# Patient Record
Sex: FEMALE | Race: ASIAN | Employment: FULL TIME | URBAN - METROPOLITAN AREA
[De-identification: names, ages, dates, MRNs, and addresses within clinical notes are randomized per-mention and may not be internally consistent; named-entity substitution may affect disease eponyms.]

---

## 2017-07-07 PROCEDURE — 84144 ASSAY OF PROGESTERONE: CPT | Performed by: OBSTETRICS & GYNECOLOGY

## 2017-07-07 PROCEDURE — 86901 BLOOD TYPING SEROLOGIC RH(D): CPT | Performed by: OBSTETRICS & GYNECOLOGY

## 2017-07-07 PROCEDURE — 86900 BLOOD TYPING SEROLOGIC ABO: CPT | Performed by: OBSTETRICS & GYNECOLOGY

## 2017-07-10 PROCEDURE — 84144 ASSAY OF PROGESTERONE: CPT | Performed by: OBSTETRICS & GYNECOLOGY

## 2017-07-28 PROBLEM — O36.80X0 ENCOUNTER TO DETERMINE FETAL VIABILITY OF PREGNANCY: Status: ACTIVE | Noted: 2017-07-28

## 2017-07-28 PROCEDURE — 86900 BLOOD TYPING SEROLOGIC ABO: CPT | Performed by: OBSTETRICS & GYNECOLOGY

## 2017-07-28 PROCEDURE — 86850 RBC ANTIBODY SCREEN: CPT | Performed by: OBSTETRICS & GYNECOLOGY

## 2017-07-28 PROCEDURE — 86901 BLOOD TYPING SEROLOGIC RH(D): CPT | Performed by: OBSTETRICS & GYNECOLOGY

## 2017-07-28 PROCEDURE — 87491 CHLMYD TRACH DNA AMP PROBE: CPT | Performed by: OBSTETRICS & GYNECOLOGY

## 2017-07-28 PROCEDURE — 87340 HEPATITIS B SURFACE AG IA: CPT | Performed by: OBSTETRICS & GYNECOLOGY

## 2017-07-28 PROCEDURE — 86780 TREPONEMA PALLIDUM: CPT | Performed by: OBSTETRICS & GYNECOLOGY

## 2017-07-28 PROCEDURE — 86762 RUBELLA ANTIBODY: CPT | Performed by: OBSTETRICS & GYNECOLOGY

## 2017-07-28 PROCEDURE — 87591 N.GONORRHOEAE DNA AMP PROB: CPT | Performed by: OBSTETRICS & GYNECOLOGY

## 2017-07-28 PROCEDURE — 87389 HIV-1 AG W/HIV-1&-2 AB AG IA: CPT | Performed by: OBSTETRICS & GYNECOLOGY

## 2017-07-28 PROCEDURE — 36415 COLL VENOUS BLD VENIPUNCTURE: CPT | Performed by: OBSTETRICS & GYNECOLOGY

## 2017-07-28 PROCEDURE — 85025 COMPLETE CBC W/AUTO DIFF WBC: CPT | Performed by: OBSTETRICS & GYNECOLOGY

## 2017-07-28 PROCEDURE — 87086 URINE CULTURE/COLONY COUNT: CPT | Performed by: OBSTETRICS & GYNECOLOGY

## 2017-11-10 ENCOUNTER — TELEPHONE (OUTPATIENT)
Dept: OBGYN UNIT | Facility: HOSPITAL | Age: 32
End: 2017-11-10

## 2017-12-01 PROCEDURE — 82950 GLUCOSE TEST: CPT | Performed by: OBSTETRICS & GYNECOLOGY

## 2017-12-08 PROCEDURE — 82951 GLUCOSE TOLERANCE TEST (GTT): CPT | Performed by: OBSTETRICS & GYNECOLOGY

## 2017-12-08 PROCEDURE — 82952 GTT-ADDED SAMPLES: CPT | Performed by: OBSTETRICS & GYNECOLOGY

## 2017-12-18 PROCEDURE — 87389 HIV-1 AG W/HIV-1&-2 AB AG IA: CPT | Performed by: OBSTETRICS & GYNECOLOGY

## 2017-12-18 PROCEDURE — 36415 COLL VENOUS BLD VENIPUNCTURE: CPT | Performed by: OBSTETRICS & GYNECOLOGY

## 2018-02-02 PROCEDURE — 87081 CULTURE SCREEN ONLY: CPT | Performed by: OBSTETRICS & GYNECOLOGY

## 2018-02-02 PROCEDURE — 87653 STREP B DNA AMP PROBE: CPT | Performed by: OBSTETRICS & GYNECOLOGY

## 2018-03-06 ENCOUNTER — HOSPITAL ENCOUNTER (INPATIENT)
Facility: HOSPITAL | Age: 33
LOS: 2 days | Discharge: HOME | End: 2018-03-08
Attending: OBSTETRICS & GYNECOLOGY | Admitting: OBSTETRICS & GYNECOLOGY
Payer: COMMERCIAL

## 2018-03-06 PROBLEM — Z34.90 PREGNANCY: Status: ACTIVE | Noted: 2018-03-06

## 2018-03-06 LAB
ANTIBODY SCREEN: NEGATIVE
BILIRUBIN URINE: NEGATIVE
CONTROL RUN WITHIN 24 HOURS?: YES
ERYTHROCYTE [DISTWIDTH] IN BLOOD BY AUTOMATED COUNT: 13.7 % (ref 11.5–16)
GLUCOSE BLD-MCNC: 101 MG/DL (ref 65–99)
GLUCOSE BLD-MCNC: 115 MG/DL (ref 65–99)
GLUCOSE BLD-MCNC: 120 MG/DL (ref 65–99)
GLUCOSE URINE: NEGATIVE
HCT VFR BLD AUTO: 35.5 % (ref 34–50)
HGB BLD-MCNC: 12.1 G/DL (ref 12–16)
KETONE URINE: NEGATIVE
MCH RBC QN AUTO: 31.5 PG (ref 27–33.2)
MCHC RBC AUTO-ENTMCNC: 34.1 G/DL (ref 31–37)
MCV RBC AUTO: 92.4 FL (ref 81–100)
NITRITE URINE: NEGATIVE
PH URINE: 6.5 (ref 5–8)
PLATELET # BLD AUTO: 257 10(3)UL (ref 150–450)
PROTEIN URINE: NEGATIVE
RBC # BLD AUTO: 3.84 X10(6)UL (ref 3.8–5.1)
RED CELL DISTRIBUTION WIDTH-SD: 46.1 FL (ref 35.1–46.3)
RH BLOOD TYPE: POSITIVE
SPEC GRAVITY: 1.02 (ref 1–1.03)
T PALLIDUM AB SER QL IA: NONREACTIVE
URINE CLARITY: CLEAR
URINE COLOR: YELLOW
UROBILINOGEN URINE: 0.2
WBC # BLD AUTO: 15.6 X10(3) UL (ref 4–13)

## 2018-03-06 PROCEDURE — 86780 TREPONEMA PALLIDUM: CPT | Performed by: OBSTETRICS & GYNECOLOGY

## 2018-03-06 PROCEDURE — 82962 GLUCOSE BLOOD TEST: CPT

## 2018-03-06 PROCEDURE — 86900 BLOOD TYPING SEROLOGIC ABO: CPT | Performed by: OBSTETRICS & GYNECOLOGY

## 2018-03-06 PROCEDURE — 0HQ9XZZ REPAIR PERINEUM SKIN, EXTERNAL APPROACH: ICD-10-PCS | Performed by: OBSTETRICS & GYNECOLOGY

## 2018-03-06 PROCEDURE — 85027 COMPLETE CBC AUTOMATED: CPT | Performed by: OBSTETRICS & GYNECOLOGY

## 2018-03-06 PROCEDURE — 86850 RBC ANTIBODY SCREEN: CPT | Performed by: OBSTETRICS & GYNECOLOGY

## 2018-03-06 PROCEDURE — 86901 BLOOD TYPING SEROLOGIC RH(D): CPT | Performed by: OBSTETRICS & GYNECOLOGY

## 2018-03-06 RX ORDER — BISACODYL 10 MG
10 SUPPOSITORY, RECTAL RECTAL ONCE AS NEEDED
Status: ACTIVE | OUTPATIENT
Start: 2018-03-06 | End: 2018-03-06

## 2018-03-06 RX ORDER — ACETAMINOPHEN 325 MG/1
650 TABLET ORAL EVERY 4 HOURS PRN
Status: DISCONTINUED | OUTPATIENT
Start: 2018-03-06 | End: 2018-03-08

## 2018-03-06 RX ORDER — IBUPROFEN 600 MG/1
600 TABLET ORAL EVERY 6 HOURS
Status: DISCONTINUED | OUTPATIENT
Start: 2018-03-06 | End: 2018-03-08

## 2018-03-06 RX ORDER — TRISODIUM CITRATE DIHYDRATE AND CITRIC ACID MONOHYDRATE 500; 334 MG/5ML; MG/5ML
30 SOLUTION ORAL AS NEEDED
Status: DISCONTINUED | OUTPATIENT
Start: 2018-03-06 | End: 2018-03-06

## 2018-03-06 RX ORDER — EPHEDRINE SULFATE 50 MG/ML
5 INJECTION, SOLUTION INTRAVENOUS AS NEEDED
Status: DISCONTINUED | OUTPATIENT
Start: 2018-03-06 | End: 2018-03-06

## 2018-03-06 RX ORDER — SODIUM CHLORIDE, SODIUM LACTATE, POTASSIUM CHLORIDE, CALCIUM CHLORIDE 600; 310; 30; 20 MG/100ML; MG/100ML; MG/100ML; MG/100ML
INJECTION, SOLUTION INTRAVENOUS CONTINUOUS
Status: DISCONTINUED | OUTPATIENT
Start: 2018-03-06 | End: 2018-03-06

## 2018-03-06 RX ORDER — SIMETHICONE 80 MG
80 TABLET,CHEWABLE ORAL 3 TIMES DAILY PRN
Status: DISCONTINUED | OUTPATIENT
Start: 2018-03-06 | End: 2018-03-08

## 2018-03-06 RX ORDER — ZOLPIDEM TARTRATE 5 MG/1
5 TABLET ORAL NIGHTLY PRN
Status: DISCONTINUED | OUTPATIENT
Start: 2018-03-06 | End: 2018-03-08

## 2018-03-06 RX ORDER — TERBUTALINE SULFATE 1 MG/ML
0.25 INJECTION, SOLUTION SUBCUTANEOUS AS NEEDED
Status: DISCONTINUED | OUTPATIENT
Start: 2018-03-06 | End: 2018-03-06

## 2018-03-06 RX ORDER — HYDROCODONE BITARTRATE AND ACETAMINOPHEN 5; 325 MG/1; MG/1
1 TABLET ORAL EVERY 4 HOURS PRN
Status: DISCONTINUED | OUTPATIENT
Start: 2018-03-06 | End: 2018-03-08

## 2018-03-06 RX ORDER — NALBUPHINE HCL 10 MG/ML
2.5 AMPUL (ML) INJECTION
Status: DISCONTINUED | OUTPATIENT
Start: 2018-03-06 | End: 2018-03-06

## 2018-03-06 RX ORDER — IBUPROFEN 600 MG/1
600 TABLET ORAL ONCE AS NEEDED
Status: DISCONTINUED | OUTPATIENT
Start: 2018-03-06 | End: 2018-03-06

## 2018-03-06 RX ORDER — DOCUSATE SODIUM 100 MG/1
100 CAPSULE, LIQUID FILLED ORAL
Status: DISCONTINUED | OUTPATIENT
Start: 2018-03-06 | End: 2018-03-08

## 2018-03-06 RX ORDER — HYDROCODONE BITARTRATE AND ACETAMINOPHEN 5; 325 MG/1; MG/1
2 TABLET ORAL EVERY 4 HOURS PRN
Status: DISCONTINUED | OUTPATIENT
Start: 2018-03-06 | End: 2018-03-08

## 2018-03-06 NOTE — PROGRESS NOTES
Pt  EDC 3/6/18 presents to L&D with c/o uc's every 5 mins since apx 0300. Pt states she has had spotting throughout the day. Pt denies leaking of fluid, states + fetal movement. Urine spec obtained. EFM tested and applied.  Pt labels verified per pt and

## 2018-03-06 NOTE — H&P
[facility] Patient Status:  Inpatient    1/3/1985 MRN [id number]   Location [address] [facility] Attending Kindra Gracia, [address] Day # 0 PCP No primary care provider on file.      Date of Admiss Rash    OBJECTIVE:    Temp:  [97.5 °F (36.4 °C)-98.7 °F (37.1 °C)] 98.7 °F (37.1 °C)  Pulse:  [] 116  Resp:  [16-18] 18  BP: ()/(50-79) 88/56    Lungs:   normal unlabored breathing, no wheezing   Heart:   regular rate and rhythm   Abdomen:

## 2018-03-06 NOTE — L&D DELIVERY NOTE
She was found to be complete/+1. She pushed with very good effort for 15 minutes under epidural anesthesia. She then delivered a viable baby girl via . The baby was vigorous on delivery and was bulb suctioned.  She was placed on the mother's lap for war clamping:  Yes   Time in sec:  30   Cord blood disposition:  to lab   Gases sent?:  No             Resuscitation    Method:  None          [place] Measurements    Weight:  7 lb 3.3 oz Length:  48.3 cm   Head circum.:  32 cm Chest circum.:  32 cm      Abdom

## 2018-03-07 LAB
BASOPHILS # BLD AUTO: 0.03 X10(3) UL (ref 0–0.1)
BASOPHILS NFR BLD AUTO: 0.2 %
EOSINOPHIL # BLD AUTO: 0.19 X10(3) UL (ref 0–0.3)
EOSINOPHIL NFR BLD AUTO: 1.2 %
ERYTHROCYTE [DISTWIDTH] IN BLOOD BY AUTOMATED COUNT: 13.9 % (ref 11.5–16)
GLUCOSE BLD-MCNC: 101 MG/DL (ref 65–99)
HCT VFR BLD AUTO: 31.7 % (ref 34–50)
HGB BLD-MCNC: 10.7 G/DL (ref 12–16)
IMMATURE GRANULOCYTE COUNT: 0.07 X10(3) UL (ref 0–1)
IMMATURE GRANULOCYTE RATIO %: 0.4 %
LYMPHOCYTES # BLD AUTO: 1.86 X10(3) UL (ref 0.9–4)
LYMPHOCYTES NFR BLD AUTO: 11.8 %
MCH RBC QN AUTO: 31.5 PG (ref 27–33.2)
MCHC RBC AUTO-ENTMCNC: 33.8 G/DL (ref 31–37)
MCV RBC AUTO: 93.2 FL (ref 81–100)
MONOCYTES # BLD AUTO: 0.92 X10(3) UL (ref 0.1–1)
MONOCYTES NFR BLD AUTO: 5.8 %
NEUTROPHIL ABS PRELIM: 12.72 X10 (3) UL (ref 1.3–6.7)
NEUTROPHILS # BLD AUTO: 12.72 X10(3) UL (ref 1.3–6.7)
NEUTROPHILS NFR BLD AUTO: 80.6 %
PLATELET # BLD AUTO: 201 10(3)UL (ref 150–450)
RBC # BLD AUTO: 3.4 X10(6)UL (ref 3.8–5.1)
RED CELL DISTRIBUTION WIDTH-SD: 47.3 FL (ref 35.1–46.3)
WBC # BLD AUTO: 15.8 X10(3) UL (ref 4–13)

## 2018-03-07 PROCEDURE — 85025 COMPLETE CBC W/AUTO DIFF WBC: CPT | Performed by: OBSTETRICS & GYNECOLOGY

## 2018-03-07 PROCEDURE — 82962 GLUCOSE BLOOD TEST: CPT

## 2018-03-07 NOTE — PROGRESS NOTES
2000-ASSISTED UP TO BATHROOM VOID 300CC BLOOD TINGED URINE. TOLERATED WELL. REPORT GIVEN TO Yon JAQUEZ.  TRANSFER TO [facility] IN STABLE CONDITION

## 2018-03-07 NOTE — PROGRESS NOTES
PPD #1  Pt without complaints  BP 99/58 (BP Location: Right arm)   Pulse 75   Temp 97.8 °F (36.6 °C) (Oral)   Resp 18   Ht 5' 6\" (1.676 m)   Wt 165 lb (74.8 kg)   LMP 05/30/2017   SpO2 97%   Breastfeeding?  Yes   BMI 26.63 kg/m²    Hb - 10.7  WBC - 15.6  F

## 2018-03-08 VITALS
TEMPERATURE: 98 F | SYSTOLIC BLOOD PRESSURE: 102 MMHG | HEART RATE: 83 BPM | OXYGEN SATURATION: 97 % | WEIGHT: 165 LBS | RESPIRATION RATE: 17 BRPM | BODY MASS INDEX: 26.52 KG/M2 | HEIGHT: 66 IN | DIASTOLIC BLOOD PRESSURE: 71 MMHG

## 2018-03-08 NOTE — PROGRESS NOTES
OB Progress Note PPD#2    S: Feels well. Ambulating, eating. Pain controlled. Lochia mild. Breastfeeding. Voiding without difficulty, + flatus,   O:   Blood pressure 102/71, pulse 83, temperature 97.7 °F (36.5 °C), temperature source Oral, resp.  rate 17,

## 2018-03-12 ENCOUNTER — NURSE ONLY (OUTPATIENT)
Dept: LACTATION | Facility: HOSPITAL | Age: 33
End: 2018-03-12
Attending: OBSTETRICS & GYNECOLOGY
Payer: COMMERCIAL

## 2018-03-12 ENCOUNTER — TELEPHONE (OUTPATIENT)
Dept: OBGYN UNIT | Facility: HOSPITAL | Age: 33
End: 2018-03-12

## 2018-03-12 DIAGNOSIS — O92.29 POSTPARTUM NIPPLE PAIN: Primary | ICD-10-CM

## 2018-03-12 PROCEDURE — 99213 OFFICE O/P EST LOW 20 MIN: CPT

## 2018-03-13 ENCOUNTER — TELEPHONE (OUTPATIENT)
Dept: OBGYN UNIT | Facility: HOSPITAL | Age: 33
End: 2018-03-13

## 2018-04-18 PROCEDURE — 87624 HPV HI-RISK TYP POOLED RSLT: CPT | Performed by: OBSTETRICS & GYNECOLOGY

## 2018-04-18 PROCEDURE — 88175 CYTOPATH C/V AUTO FLUID REDO: CPT | Performed by: OBSTETRICS & GYNECOLOGY

## 2021-07-23 ENCOUNTER — OFFICE VISIT (OUTPATIENT)
Dept: PERINATAL CARE | Facility: HOSPITAL | Age: 36
End: 2021-07-23
Attending: OBSTETRICS & GYNECOLOGY
Payer: COMMERCIAL

## 2021-07-23 ENCOUNTER — LAB ENCOUNTER (OUTPATIENT)
Dept: LAB | Age: 36
End: 2021-07-23
Attending: OBSTETRICS & GYNECOLOGY
Payer: COMMERCIAL

## 2021-07-23 VITALS
WEIGHT: 153 LBS | BODY MASS INDEX: 24.59 KG/M2 | HEART RATE: 82 BPM | HEIGHT: 66 IN | SYSTOLIC BLOOD PRESSURE: 112 MMHG | DIASTOLIC BLOOD PRESSURE: 71 MMHG

## 2021-07-23 DIAGNOSIS — O09.522 MULTIGRAVIDA OF ADVANCED MATERNAL AGE IN SECOND TRIMESTER: ICD-10-CM

## 2021-07-23 DIAGNOSIS — O36.8390 FETAL TACHYCARDIA AFFECTING MANAGEMENT OF MOTHER: ICD-10-CM

## 2021-07-23 DIAGNOSIS — R00.9 HEART BEAT ABNORMALITY: ICD-10-CM

## 2021-07-23 DIAGNOSIS — O36.8390 FETAL ARRHYTHMIA AFFECTING PREGNANCY, ANTEPARTUM: Primary | ICD-10-CM

## 2021-07-23 DIAGNOSIS — O36.8390 FETAL ARRHYTHMIA AFFECTING PREGNANCY, ANTEPARTUM: ICD-10-CM

## 2021-07-23 PROCEDURE — 76827 ECHO EXAM OF FETAL HEART: CPT | Performed by: OBSTETRICS & GYNECOLOGY

## 2021-07-23 PROCEDURE — 36415 COLL VENOUS BLD VENIPUNCTURE: CPT

## 2021-07-23 PROCEDURE — 76827 ECHO EXAM OF FETAL HEART: CPT

## 2021-07-23 PROCEDURE — 99205 OFFICE O/P NEW HI 60 MIN: CPT | Performed by: OBSTETRICS & GYNECOLOGY

## 2021-07-23 PROCEDURE — 86235 NUCLEAR ANTIGEN ANTIBODY: CPT

## 2021-07-23 PROCEDURE — 76805 OB US >/= 14 WKS SNGL FETUS: CPT | Performed by: OBSTETRICS & GYNECOLOGY

## 2021-07-23 NOTE — PROGRESS NOTES
Outpatient Maternal-Fetal Medicine Consultation    Dear Dr. Octavio Haro    Thank you for requesting ultrasound evaluation and maternal fetal medicine consultation on your patient Frida Bautista.   As you are aware she is a 39year old female  with a singl PRENATAL 27-0.8 MG Oral Tab, Take 1 tablet by mouth daily. , Disp: , Rfl:   •  Docosahexaenoic Acid (DHA COMPLETE OR), Take by mouth., Disp: , Rfl:   Allergies:   Sulfa Antibiotics       RASH    PHYSICAL EXAMINATION:  /71   Pulse 82   Ht 5' 6\" (1.676 diabetes  · Intrauterine fetal death    As a result, enhanced pregnancy surveillance is advised for these patients including a comprehensive ultrasound to assess for fetal malformations  (at 20 weeks) and a third trimester ultrasound assessment for fetal g rates.    The patient has already obtained a low-risk  NPIT result and was appropriately reassured. FETAL ARRHYTHMIA  The atrial rate appears to be in the 145–150 range and the ventricular rate in the 70s.   I suspect this represents intermittently nonc

## 2021-07-26 ENCOUNTER — ULTRASOUND ENCOUNTER (OUTPATIENT)
Dept: PERINATAL CARE | Facility: HOSPITAL | Age: 36
End: 2021-07-26
Attending: PEDIATRICS
Payer: COMMERCIAL

## 2021-07-26 DIAGNOSIS — O09.522 MULTIGRAVIDA OF ADVANCED MATERNAL AGE IN SECOND TRIMESTER: Primary | ICD-10-CM

## 2021-07-26 DIAGNOSIS — O09.522 MULTIGRAVIDA OF ADVANCED MATERNAL AGE IN SECOND TRIMESTER: ICD-10-CM

## 2021-07-26 DIAGNOSIS — O36.8390 FETAL TACHYCARDIA AFFECTING MANAGEMENT OF MOTHER: ICD-10-CM

## 2021-07-26 LAB
SSA AUTOAB: <100 AU/ML (ref ?–100)
SSB AUTOAB: <100 AU/ML (ref ?–100)

## 2021-07-26 PROCEDURE — 76827 ECHO EXAM OF FETAL HEART: CPT

## 2021-07-26 PROCEDURE — 76825 ECHO EXAM OF FETAL HEART: CPT | Performed by: PEDIATRICS

## 2021-07-26 PROCEDURE — 93325 DOPPLER ECHO COLOR FLOW MAPG: CPT

## 2021-07-26 NOTE — PROGRESS NOTES
CARDIOLOGY CONSULTATION AND FETAL ECHOCARDIOGRAM :    Dear Dr. Jh Driscoll     Thank you for requesting fetal echocardiogram and  cardiology consultation on your patient Natalia Taylor.   As you are aware she is a 39year old female  wi grandmother is alive. She indicated that her maternal grandfather is alive. She indicated that her paternal grandmother is alive.  She indicated that her paternal grandfather is alive.        Medications:   Current Outpatient Medications:   •  PRENATAL 27-0 no  Limited unremarkable fetal echocardiogram with one to one conduction. Normal mechanical PA intervals. Fetal echocardiogram findings are described above. No color Doppler flow was used due to early pregnancy.  It was a limited unremarkable fetal echoca

## 2021-08-04 ENCOUNTER — OFFICE VISIT (OUTPATIENT)
Dept: PERINATAL CARE | Facility: HOSPITAL | Age: 36
End: 2021-08-04
Attending: PEDIATRICS
Payer: COMMERCIAL

## 2021-08-04 DIAGNOSIS — O36.8390 FETAL ARRHYTHMIA AFFECTING PREGNANCY, ANTEPARTUM: Primary | ICD-10-CM

## 2021-08-04 DIAGNOSIS — O36.8390 FETAL ARRHYTHMIA AFFECTING PREGNANCY, ANTEPARTUM: ICD-10-CM

## 2021-08-04 PROCEDURE — 93325 DOPPLER ECHO COLOR FLOW MAPG: CPT

## 2021-08-04 PROCEDURE — 76827 ECHO EXAM OF FETAL HEART: CPT

## 2021-08-04 PROCEDURE — 76825 ECHO EXAM OF FETAL HEART: CPT | Performed by: PEDIATRICS

## 2021-08-04 NOTE — PROGRESS NOTES
CARDIOLOGY CONSULTATION AND FETAL ECHOCARDIOGRAM :     Dear Dr. Polk     Thank you for requesting a  follow up fetal echocardiogram and a follow up  cardiology consultation on your patient Domitila St.    As you are aware she is a 2 that her maternal grandmother is alive. She indicated that her maternal grandfather is alive. She indicated that her paternal grandmother is alive.  She indicated that her paternal grandfather is alive.        Medications:   Current Outpatient Medications: arteries. Main PA: the main pulmonary artery can be seen bifurcating into the ductus arteriosus and the right pulmonary artery. Ascending aorta: normal size and morphology. Ductal arch: Left ductus arteriosus.  Aortic arch: Single left sided aortic arch wit antibodies  · Limited unremarkable fetal echocardiogram with one to one conduction. Normal mechanical KS intervals. No evidence for fetal heart block    RECOMMENDATIONS :    · Continue care with Dr. Polk and KAILEE  · Level II at 20 weeks.   · Patient to [facility]

## 2021-10-23 ENCOUNTER — TELEPHONE (OUTPATIENT)
Dept: OBGYN UNIT | Facility: HOSPITAL | Age: 36
End: 2021-10-23

## 2021-10-23 DIAGNOSIS — O24.410 DIET CONTROLLED GESTATIONAL DIABETES MELLITUS (GDM) IN THIRD TRIMESTER: Primary | ICD-10-CM

## 2021-10-23 DIAGNOSIS — R73.01 ABNORMAL FASTING GLUCOSE: ICD-10-CM

## 2021-10-23 NOTE — TELEPHONE ENCOUNTER
I have reviewed the patient's Bs - she has consisitently elevated fasting BS's.  Please advise that she see [facility] for consultation for possible medications

## 2021-10-25 NOTE — TELEPHONE ENCOUNTER
Per chart review   Patient seen by Diabetes Counselor 10/1/21    Original referral is on chart - updated with Dr. Hunter Khoury note and marked as Urgent.   In Addition,   MFM referral Created     patient made aware  Verbalized understanding and agrees to plan

## 2021-10-28 NOTE — PROGRESS NOTES
Jenae Wood  Dear Dr. Hunter Khoury     Thank you for requesting an ultrasound and maternal-fetal medicine consultation on your patient Cristhian Daniel.   As you are aware she is a 39year old female  with a jarrett preg reassured.      FETAL ARRHYTHMIA  RESOLVED    GESTATIONAL DIABETES  1 hour GDM screen was 198 mg/dL. In light of her high screen results and history of GDM, her 3-hour GTT test was not performed in this pregnancy.   The patient was informed of the potential pregnancy but the long term data on / childhood effects are lacking. Metformin crosses the placenta and  levels are >70% of the maternal level at delivery.   Metformin is not associated with increased rates for NICU admission,  hypog care.     Case discussed with patient who demonstrated understanding and agreement with plan. Thank you for allowing me to participate in the care of this patient. Please feel free to contact me with any questions.     Shaw Ko MD  Maternal-Fetal Me

## 2021-10-29 ENCOUNTER — ULTRASOUND ENCOUNTER (OUTPATIENT)
Dept: PERINATAL CARE | Facility: HOSPITAL | Age: 36
End: 2021-10-29
Attending: OBSTETRICS & GYNECOLOGY
Payer: COMMERCIAL

## 2021-10-29 VITALS
SYSTOLIC BLOOD PRESSURE: 107 MMHG | DIASTOLIC BLOOD PRESSURE: 72 MMHG | HEIGHT: 66 IN | WEIGHT: 175 LBS | BODY MASS INDEX: 28.13 KG/M2 | HEART RATE: 99 BPM

## 2021-10-29 DIAGNOSIS — O24.414 GESTATIONAL DIABETES MELLITUS (GDM) REQUIRING INSULIN: ICD-10-CM

## 2021-10-29 DIAGNOSIS — O24.419 GDM (GESTATIONAL DIABETES MELLITUS): ICD-10-CM

## 2021-10-29 DIAGNOSIS — O09.523 MULTIGRAVIDA OF ADVANCED MATERNAL AGE IN THIRD TRIMESTER: ICD-10-CM

## 2021-10-29 DIAGNOSIS — O36.8390 FETAL ARRHYTHMIA AFFECTING PREGNANCY, ANTEPARTUM: ICD-10-CM

## 2021-10-29 DIAGNOSIS — O09.523 MULTIGRAVIDA OF ADVANCED MATERNAL AGE IN THIRD TRIMESTER: Primary | ICD-10-CM

## 2021-10-29 PROCEDURE — 99215 OFFICE O/P EST HI 40 MIN: CPT | Performed by: OBSTETRICS & GYNECOLOGY

## 2021-10-29 PROCEDURE — 76816 OB US FOLLOW-UP PER FETUS: CPT | Performed by: OBSTETRICS & GYNECOLOGY

## 2021-10-29 RX ORDER — LANCING DEVICE
1 EACH MISCELLANEOUS NIGHTLY
Qty: 100 EACH | Refills: 1 | Status: SHIPPED | OUTPATIENT
Start: 2021-10-29

## 2021-11-08 ENCOUNTER — TELEPHONE (OUTPATIENT)
Dept: PERINATAL CARE | Facility: HOSPITAL | Age: 36
End: 2021-11-08

## 2021-11-08 NOTE — TELEPHONE ENCOUNTER
32w2d  Received BS log for date range 10/30-11/8     Low  High Out of Range   Fasting Blood Sugar 90 116 7 out of 9   Post Breakfast 90 114 0 out of 9   Post Lunch 91 120 1 out of 9   Post Dinner 96 121 2 out of 8     Patient is currently taking Levemir 10 units HS. To Dr. Chris Cesar for review.

## 2021-11-11 ENCOUNTER — TELEPHONE (OUTPATIENT)
Dept: PERINATAL CARE | Facility: HOSPITAL | Age: 36
End: 2021-11-11

## 2021-11-11 NOTE — TELEPHONE ENCOUNTER
32w5d  Received BS log for date range 11/9-11/11     Low  High Out of Range   Fasting Blood Sugar 100 107 3 out of 3   Post Breakfast 108 120 1 out of 2   Post Lunch 108 125 1 out of 2   Post Dinner 98 110 0 out of 2       Pt messaged concerned that even after increasing her Levemir to 14 units on 11/8, fasting sugars remain elevated. Please advise. Patient is currently taking Levemir 14 units @ HS. To Dr. Love Daily for review.

## 2021-11-11 NOTE — TELEPHONE ENCOUNTER
New regimen:    Levemir 18 units @ [facility]. Ravinder Diallo M.D.   Maternal-Fetal Medicine

## 2021-11-18 ENCOUNTER — TELEPHONE (OUTPATIENT)
Dept: PERINATAL CARE | Facility: HOSPITAL | Age: 36
End: 2021-11-18

## 2021-11-18 DIAGNOSIS — O24.414 GESTATIONAL DIABETES MELLITUS (GDM) REQUIRING INSULIN: ICD-10-CM

## 2021-11-18 NOTE — TELEPHONE ENCOUNTER
New regimen:    Levemir 22 units at .[facility]. Denver Flanagan M.D.   Maternal-Fetal Medicine

## 2021-11-18 NOTE — TELEPHONE ENCOUNTER
33w5d  Received BS log for date range 11/12-11/17     Low  High Out of Range   Fasting Blood Sugar 92 110 4/5   Post Breakfast 93 120 1/5   Post Lunch 89 144 1/6   Post Dinner 105 120 1/5     Patient is currently taking Levemir 18 units at [facility]    To Dr. Miramontes Estimable

## 2021-11-24 ENCOUNTER — OFFICE VISIT (OUTPATIENT)
Dept: PERINATAL CARE | Facility: HOSPITAL | Age: 36
End: 2021-11-24
Attending: OBSTETRICS & GYNECOLOGY
Payer: COMMERCIAL

## 2021-11-24 VITALS
DIASTOLIC BLOOD PRESSURE: 62 MMHG | WEIGHT: 178 LBS | SYSTOLIC BLOOD PRESSURE: 106 MMHG | HEART RATE: 73 BPM | BODY MASS INDEX: 29 KG/M2

## 2021-11-24 DIAGNOSIS — O36.8390 FETAL ARRHYTHMIA AFFECTING PREGNANCY, ANTEPARTUM: ICD-10-CM

## 2021-11-24 DIAGNOSIS — O09.523 MULTIGRAVIDA OF ADVANCED MATERNAL AGE IN THIRD TRIMESTER: ICD-10-CM

## 2021-11-24 DIAGNOSIS — O24.414 INSULIN CONTROLLED GESTATIONAL DIABETES MELLITUS (GDM) IN THIRD TRIMESTER: ICD-10-CM

## 2021-11-24 DIAGNOSIS — O09.523 MULTIGRAVIDA OF ADVANCED MATERNAL AGE IN THIRD TRIMESTER: Primary | ICD-10-CM

## 2021-11-24 DIAGNOSIS — O24.414 GESTATIONAL DIABETES MELLITUS (GDM) REQUIRING INSULIN: ICD-10-CM

## 2021-11-24 PROCEDURE — 76816 OB US FOLLOW-UP PER FETUS: CPT | Performed by: OBSTETRICS & GYNECOLOGY

## 2021-11-24 PROCEDURE — 76819 FETAL BIOPHYS PROFIL W/O NST: CPT | Performed by: OBSTETRICS & GYNECOLOGY

## 2021-11-24 PROCEDURE — 99212 OFFICE O/P EST SF 10 MIN: CPT | Performed by: OBSTETRICS & GYNECOLOGY

## 2021-11-24 PROCEDURE — 76819 FETAL BIOPHYS PROFIL W/O NST: CPT

## 2021-11-24 NOTE — PROGRESS NOTES
Isi Nixon    Dear Dr. Natasha Spring    Thank you for requesting ultrasound evaluation and maternal fetal medicine consultation on your patient Ezio Salter.   As you are aware she is a 39year old female  with a singleto A 3 vessel cord is noted. Cardiac activity is present at 126 bpm  EFW 2825 g ( 6 lb 4 oz); 70%. JOYCE is  12.9 cm. MVP is 4.7 cm  BPP is 8/8. The fetal measurements are consistent with established EDC.  No gross ultrasound evidence of structural ab cord insertion   · Possible Levemir allergy     RECOMMENDATIONS:  · Continue care with Dr. Polk  · Follow-up Growth and BPP in 4 weeks  · Insulin as noted above-convert to Lantus dosing weekly NST's at 32 weeks and increase to twice weekly at 34 weeks  ·

## 2021-11-24 NOTE — PROGRESS NOTES
Pt here for Growth Ultrasound/BPP/Velamentous cord insertion/AMA/GDM  +fm noted per patient  Pt denies complaints.

## 2021-11-29 ENCOUNTER — TELEPHONE (OUTPATIENT)
Dept: PERINATAL CARE | Facility: HOSPITAL | Age: 36
End: 2021-11-29

## 2021-11-29 NOTE — TELEPHONE ENCOUNTER
35w2d  Received BS log for date range 11/25-11/29     Low  High Out of Range   Fasting Blood Sugar 87 105 3 out of 5   Post Breakfast 93 120 1 out of 4   Post Lunch 105 120 1 out of 4   Post Dinner 102 122 1 out of 3     Patient is currently taking Lantus 30 units HS. To Dr. Paola Euceda for review.

## 2021-12-01 RX ORDER — INSULIN GLARGINE 100 [IU]/ML
32 INJECTION, SOLUTION SUBCUTANEOUS NIGHTLY
Qty: 9.92 ML | Refills: 0 | Status: SHIPPED | OUTPATIENT
Start: 2021-12-01 | End: 2021-12-24

## 2021-12-19 ENCOUNTER — HOSPITAL ENCOUNTER (OUTPATIENT)
Facility: HOSPITAL | Age: 36
Setting detail: OBSERVATION
Discharge: HOME | End: 2021-12-19
Attending: OBSTETRICS & GYNECOLOGY | Admitting: OBSTETRICS & GYNECOLOGY
Payer: COMMERCIAL

## 2021-12-19 VITALS
HEART RATE: 95 BPM | HEIGHT: 65.98 IN | WEIGHT: 185 LBS | TEMPERATURE: 98 F | SYSTOLIC BLOOD PRESSURE: 124 MMHG | BODY MASS INDEX: 29.73 KG/M2 | DIASTOLIC BLOOD PRESSURE: 60 MMHG

## 2021-12-19 PROCEDURE — 59025 FETAL NON-STRESS TEST: CPT

## 2021-12-19 PROCEDURE — 99213 OFFICE O/P EST LOW 20 MIN: CPT

## 2021-12-19 NOTE — PROGRESS NOTES
Pt is a 39year old female admitted to TRG4/TRG4-A. Patient presents with:  R/o Labor: Abdominal pain started at 6-7 this morning, intermittent 3-4/10 on pain scale. Pt is  38w1d intra-uterine pregnancy. History obtained, consents signed.  Or

## 2021-12-19 NOTE — NST
Physician Evaluation      NST Interpretation: Reactive    Disposition:   Discharged    Comments:    Not in labor    Julia Valverde MD

## 2021-12-19 NOTE — PROGRESS NOTES
Discharged to home per ambulatory in stable condition not in active labor with written and verbal instructions. Patient verbalizes understanding of information given.

## 2021-12-19 NOTE — NST
Nonstress Test   Patient: Mckay Abreu    Gestation: 38w1d    NST:       Variability: Moderate           Accelerations: Yes           Decelerations: None            Baseline: 130 BPM           Uterine Irritability: No           Contractions: Irregular

## 2021-12-22 ENCOUNTER — HOSPITAL ENCOUNTER (INPATIENT)
Facility: HOSPITAL | Age: 36
LOS: 2 days | Discharge: HOME | End: 2021-12-24
Attending: OBSTETRICS & GYNECOLOGY | Admitting: OBSTETRICS & GYNECOLOGY
Payer: COMMERCIAL

## 2021-12-22 ENCOUNTER — APPOINTMENT (OUTPATIENT)
Dept: OBGYN CLINIC | Facility: HOSPITAL | Age: 36
End: 2021-12-22
Payer: COMMERCIAL

## 2021-12-22 PROCEDURE — 3E0P7GC INTRODUCTION OF OTHER THERAPEUTIC SUBSTANCE INTO FEMALE REPRODUCTIVE, VIA NATURAL OR ARTIFICIAL OPENING: ICD-10-PCS | Performed by: OBSTETRICS & GYNECOLOGY

## 2021-12-22 PROCEDURE — 82962 GLUCOSE BLOOD TEST: CPT

## 2021-12-22 PROCEDURE — 86850 RBC ANTIBODY SCREEN: CPT | Performed by: OBSTETRICS & GYNECOLOGY

## 2021-12-22 PROCEDURE — 85025 COMPLETE CBC W/AUTO DIFF WBC: CPT | Performed by: OBSTETRICS & GYNECOLOGY

## 2021-12-22 PROCEDURE — 86900 BLOOD TYPING SEROLOGIC ABO: CPT | Performed by: OBSTETRICS & GYNECOLOGY

## 2021-12-22 PROCEDURE — 86780 TREPONEMA PALLIDUM: CPT | Performed by: OBSTETRICS & GYNECOLOGY

## 2021-12-22 PROCEDURE — 86901 BLOOD TYPING SEROLOGIC RH(D): CPT | Performed by: OBSTETRICS & GYNECOLOGY

## 2021-12-22 RX ORDER — IBUPROFEN 600 MG/1
600 TABLET ORAL EVERY 6 HOURS PRN
Status: DISCONTINUED | OUTPATIENT
Start: 2021-12-22 | End: 2021-12-23

## 2021-12-22 RX ORDER — AMMONIA INHALANTS 0.04 G/.3ML
0.3 INHALANT RESPIRATORY (INHALATION) AS NEEDED
Status: DISCONTINUED | OUTPATIENT
Start: 2021-12-22 | End: 2021-12-23

## 2021-12-22 RX ORDER — HYDROMORPHONE HYDROCHLORIDE 1 MG/ML
1 INJECTION, SOLUTION INTRAMUSCULAR; INTRAVENOUS; SUBCUTANEOUS EVERY 2 HOUR PRN
Status: DISCONTINUED | OUTPATIENT
Start: 2021-12-22 | End: 2021-12-23

## 2021-12-22 RX ORDER — ONDANSETRON 2 MG/ML
4 INJECTION INTRAMUSCULAR; INTRAVENOUS EVERY 6 HOURS PRN
Status: DISCONTINUED | OUTPATIENT
Start: 2021-12-22 | End: 2021-12-23

## 2021-12-22 RX ORDER — TRISODIUM CITRATE DIHYDRATE AND CITRIC ACID MONOHYDRATE 500; 334 MG/5ML; MG/5ML
30 SOLUTION ORAL AS NEEDED
Status: DISCONTINUED | OUTPATIENT
Start: 2021-12-22 | End: 2021-12-23

## 2021-12-22 RX ORDER — ACETAMINOPHEN 500 MG
500 TABLET ORAL EVERY 6 HOURS PRN
Status: DISCONTINUED | OUTPATIENT
Start: 2021-12-22 | End: 2021-12-23

## 2021-12-22 RX ORDER — TERBUTALINE SULFATE 1 MG/ML
0.25 INJECTION, SOLUTION SUBCUTANEOUS AS NEEDED
Status: DISCONTINUED | OUTPATIENT
Start: 2021-12-22 | End: 2021-12-23

## 2021-12-22 RX ORDER — SODIUM CHLORIDE, SODIUM LACTATE, POTASSIUM CHLORIDE, CALCIUM CHLORIDE 600; 310; 30; 20 MG/100ML; MG/100ML; MG/100ML; MG/100ML
INJECTION, SOLUTION INTRAVENOUS CONTINUOUS
Status: DISCONTINUED | OUTPATIENT
Start: 2021-12-22 | End: 2021-12-23

## 2021-12-23 ENCOUNTER — ANESTHESIA (OUTPATIENT)
Dept: OBGYN UNIT | Facility: HOSPITAL | Age: 36
End: 2021-12-23
Payer: COMMERCIAL

## 2021-12-23 ENCOUNTER — ANESTHESIA EVENT (OUTPATIENT)
Dept: OBGYN UNIT | Facility: HOSPITAL | Age: 36
End: 2021-12-23
Payer: COMMERCIAL

## 2021-12-23 PROCEDURE — 0HQ9XZZ REPAIR PERINEUM SKIN, EXTERNAL APPROACH: ICD-10-PCS | Performed by: OBSTETRICS & GYNECOLOGY

## 2021-12-23 PROCEDURE — 10907ZC DRAINAGE OF AMNIOTIC FLUID, THERAPEUTIC FROM PRODUCTS OF CONCEPTION, VIA NATURAL OR ARTIFICIAL OPENING: ICD-10-PCS | Performed by: OBSTETRICS & GYNECOLOGY

## 2021-12-23 PROCEDURE — 82962 GLUCOSE BLOOD TEST: CPT

## 2021-12-23 RX ORDER — DEXTROSE MONOHYDRATE 25 G/50ML
50 INJECTION, SOLUTION INTRAVENOUS
Status: DISCONTINUED | OUTPATIENT
Start: 2021-12-23 | End: 2021-12-23

## 2021-12-23 RX ORDER — ACETAMINOPHEN 325 MG/1
650 TABLET ORAL EVERY 6 HOURS PRN
Status: DISCONTINUED | OUTPATIENT
Start: 2021-12-23 | End: 2021-12-24

## 2021-12-23 RX ORDER — BUPIVACAINE HCL/0.9 % NACL/PF 0.25 %
5 PLASTIC BAG, INJECTION (ML) EPIDURAL AS NEEDED
Status: DISCONTINUED | OUTPATIENT
Start: 2021-12-23 | End: 2021-12-23

## 2021-12-23 RX ORDER — IBUPROFEN 600 MG/1
600 TABLET ORAL EVERY 6 HOURS
Status: DISCONTINUED | OUTPATIENT
Start: 2021-12-23 | End: 2021-12-24

## 2021-12-23 RX ORDER — BISACODYL 10 MG
10 SUPPOSITORY, RECTAL RECTAL ONCE AS NEEDED
Status: DISCONTINUED | OUTPATIENT
Start: 2021-12-23 | End: 2021-12-24

## 2021-12-23 RX ORDER — METHYLERGONOVINE MALEATE 0.2 MG/ML
INJECTION INTRAVENOUS
Status: COMPLETED
Start: 2021-12-23 | End: 2021-12-23

## 2021-12-23 RX ORDER — SIMETHICONE 80 MG
80 TABLET,CHEWABLE ORAL 3 TIMES DAILY PRN
Status: DISCONTINUED | OUTPATIENT
Start: 2021-12-23 | End: 2021-12-24

## 2021-12-23 RX ORDER — LIDOCAINE HYDROCHLORIDE 10 MG/ML
INJECTION, SOLUTION EPIDURAL; INFILTRATION; INTRACAUDAL; PERINEURAL AS NEEDED
Status: DISCONTINUED | OUTPATIENT
Start: 2021-12-23 | End: 2021-12-23 | Stop reason: SURG

## 2021-12-23 RX ORDER — NALBUPHINE HCL 10 MG/ML
2.5 AMPUL (ML) INJECTION
Status: DISCONTINUED | OUTPATIENT
Start: 2021-12-23 | End: 2021-12-23

## 2021-12-23 RX ORDER — LIDOCAINE HYDROCHLORIDE AND EPINEPHRINE 15; 5 MG/ML; UG/ML
INJECTION, SOLUTION EPIDURAL AS NEEDED
Status: DISCONTINUED | OUTPATIENT
Start: 2021-12-23 | End: 2021-12-23 | Stop reason: SURG

## 2021-12-23 RX ORDER — DOCUSATE SODIUM 100 MG/1
100 CAPSULE, LIQUID FILLED ORAL
Status: DISCONTINUED | OUTPATIENT
Start: 2021-12-23 | End: 2021-12-24

## 2021-12-23 RX ADMIN — LIDOCAINE HYDROCHLORIDE AND EPINEPHRINE 3 ML: 15; 5 INJECTION, SOLUTION EPIDURAL at 10:22:00

## 2021-12-23 RX ADMIN — LIDOCAINE HYDROCHLORIDE 3 MG: 10 INJECTION, SOLUTION EPIDURAL; INFILTRATION; INTRACAUDAL; PERINEURAL at 10:17:00

## 2021-12-23 NOTE — PAYOR COMM NOTE
--------------  ADMISSION REVIEW     Payor: [address] #:  [id number]  Authorization Number:  [id number]           H&P - H&P Note          SUBJECTIVE:    Martha Arteaga is a 39year old  female with [facility] 22 a secondary to 44 Emi Hernandez  3. AMA  4. GDMA2 - on insulin. Will monitor during pregnancy.     5. FWB - reassuring        12/23 L&D NOTE    Pre Op Dx:  IUP at 38 weeks; gest DM A2     Post Op Dx: Same     Procedure: Normal Spontaneous Vaginal Delivery(precipitous) (SUBLIMAZE) 0.05 MG/ML injection 100 mcg     Date Action Dose Route User    12/23/2021 1007 Given 100 mcg Epidural Marian Primrose, MD      fentaNYL citrate (SUBLIMAZE) 0.05 MG/ML injection     Date Action Dose Route User    12/23/2021 1024 Given 100 mcg Epidu Gayla Monk, JORJE    12/23/2021 1033 Rate/Dose Change 6 ruthann-units/min Intravenous Gayla Monk, JORJE    12/23/2021 7106 Rate/Dose Change 4 ruthann-units/min Intravenous Gayla Monk, JORJE    12/23/2021 7732 New Bag 2 ruthann-units/min Intravenous Robert

## 2021-12-23 NOTE — L&D DELIVERY NOTE
Vaginal Delivery Note          Mckay Abreu Patient Status:  Inpatient    1/3/1985 MRN [id number]   Location [facility] Attending Yenni Draper, [address] [facility] Day # 1 PCP None P

## 2021-12-23 NOTE — ANESTHESIA PROCEDURE NOTES
Labor Analgesia  Performed by: Ngoc Brown MD  Authorized by: Ngoc Brown MD       General Information and Staff    Start Time:  12/23/2021 10:15 AM  End Time:  12/23/2021 10:21 AM  Anesthesiologist:  gNoc Brown MD  Performed by:   Anesthesiologist  Mendez De La Rosa

## 2021-12-23 NOTE — PROGRESS NOTES
NURSING ADMISSION NOTE    Patient admitted via wheelchair. Oriented to room. Safety precautions initiated. Bed in low position. Call light in reach. Both mom/ baby ID bands verified, hugs and kisses on,  safety reviewed.

## 2021-12-23 NOTE — H&P
[address] Patient Status:  Inpatient    1/3/1985 MRN [id number]   Location [address] [place] [address] Attending Jerica Benavidez, [address] [facility] Day # 1 PCP None Pcp     SUBJECTIVE:    Adelaida Titus is a 39 yea 3  ONETOUCH DELICA LANCETS 28R Does not apply Misc, Test QID as directed, Disp: 200 each, Rfl: 2      Allergies:   Sulfa Antibiotics       RASH    OBJECTIVE:    Temp:  [97.1 °F (36.2 °C)-98.6 °F (37 °C)] 98.6 °F (37 °C)  Pulse:  [71-86] 76  Resp:  [18] 18

## 2021-12-23 NOTE — ANESTHESIA PREPROCEDURE EVALUATION
PRE-OP EVALUATION    Patient Name: Florence Isaac    Admit Diagnosis: preg state  Pregnancy   (normal spontaneous vaginal delivery)    Pre-op Diagnosis: * No surgery found *        Anesthesia Procedure: LABOR ANALGESIA    * Surgery not found *    Pre-o Q6H  acetaminophen (TYLENOL) tab 650 mg, 650 mg, Oral, Q6H PRN  docusate sodium (COLACE) cap 100 mg, 100 mg, Oral, [email or web address]  bisacodyl (DULCOLAX) rectal suppository 10 mg, 10 mg, Rectal, Once PRN  magnesium hydroxide (MILK OF MAGNESIA) 400 MG/5ML susp Complications  (-) history of anesthetic complications         GI/Hepatic/Renal    Negative GI/hepatic/renal ROS. Cardiovascular    Negative cardiovascular ROS.     Exercise tolerance: good     MET: >4 Admission:  **None**

## 2021-12-23 NOTE — PROGRESS NOTES
Patient called, reports increase pain and pressure. RN at bedside, took patient off of peanut ball and noted baby to be crowing. Pt involuntary pushing, baby delivered easily with RN at bedside in one push. MD notified, second RN called to bedside. Baby pin

## 2021-12-23 NOTE — PROGRESS NOTES
Patient up to bathroom with assist x 2. Unable to void at this time. Patient transferred to mother/baby room  per wheelchair in stable condition with baby and personal belongings. Accompanied by significant other and staff.   Report given to [facility] mother/b

## 2021-12-23 NOTE — PROGRESS NOTES
Pt is a 39year old female admitted to 104/-A. Patient presents with:  Scheduled Induction: Cytotec for GDM-insulin dependent     Pt is  38w4d intra-uterine pregnancy. History obtained, consents signed.  Oriented to room, staff, and plan of car

## 2021-12-24 VITALS
HEIGHT: 65.98 IN | HEART RATE: 86 BPM | BODY MASS INDEX: 29.73 KG/M2 | SYSTOLIC BLOOD PRESSURE: 107 MMHG | RESPIRATION RATE: 18 BRPM | DIASTOLIC BLOOD PRESSURE: 56 MMHG | TEMPERATURE: 98 F | WEIGHT: 185 LBS

## 2021-12-24 PROCEDURE — 85025 COMPLETE CBC W/AUTO DIFF WBC: CPT | Performed by: OBSTETRICS & GYNECOLOGY

## 2021-12-24 PROCEDURE — 82962 GLUCOSE BLOOD TEST: CPT

## 2021-12-24 RX ORDER — IBUPROFEN 600 MG/1
600 TABLET ORAL EVERY 6 HOURS PRN
Qty: 30 TABLET | Refills: 0 | Status: SHIPPED | OUTPATIENT
Start: 2021-12-24

## 2021-12-24 NOTE — PROGRESS NOTES
Labor Analgesia Follow Up Note    Patient underwent epidural anesthesia for labor analgesia,    Placenta Date/Time: 12/23/2021 11:40 AM    Delivery Date/Time[de-identified] 12/23/2021  11:32 AM    /56 (BP Location: Left arm)   Pulse 86   Temp 98.2 °F (36.8 °C) (O

## 2021-12-24 NOTE — PROGRESS NOTES
PPD #1  Pt without complaints - desires dc home  /56 (BP Location: Left arm)   Pulse 86   Temp 98.2 °F (36.8 °C) (Oral)   Resp 18   Ht 5' 5.98\" (1.676 m)   Wt 185 lb (83.9 kg)   LMP 03/27/2021   Breastfeeding Yes   BMI 29.87 kg/m²   Fundus firm at U

## 2021-12-24 NOTE — PROGRESS NOTES
Discharge patient home as order. Teaching complete, patient feel comfortable in taking care of herself and  infant. Hugs and kisses off. Sent both mom and infant to their family car @ [phone].

## 2021-12-26 ENCOUNTER — TELEPHONE (OUTPATIENT)
Dept: OBGYN UNIT | Facility: HOSPITAL | Age: 36
End: 2021-12-26

## (undated) DIAGNOSIS — O24.414 GESTATIONAL DIABETES MELLITUS (GDM) REQUIRING INSULIN: ICD-10-CM

## (undated) NOTE — Clinical Note
IUP at 30w6d  AMA: low-risk cell free fetal DNA, declined invasive testing  Gestational diabetes, A2  Velamentous cord insertion      RECOMMENDATIONS:  Continue care with Dr. Polk  Follow-up Growth and BPP in 4 weeks  Insulin as noted above  Weekly NST's

## (undated) NOTE — LETTER
Dear new mom:    Sorry, we missed you! The nurses of [facility] have tried to reach you by phone to ask if you have any questions regarding your health or the health and care of your new little one.     We hope you are doing moms and their babies (up to 7 months of age). Relatives and friends are welcome to attend with the new mom. Includes breastfeeding support with an IBCLC (lactation consultant) who is available to answer your breastfeeding questions.     Mom & Baby Hour (El go to [email or web address] or call [phone].  Fitness at [address]: [phone]  • [place]o: [phone]    Facebook Groups  Healthy Driven Moms—Celebrating motherhood.  For expec